# Patient Record
Sex: MALE | Employment: FULL TIME | URBAN - METROPOLITAN AREA
[De-identification: names, ages, dates, MRNs, and addresses within clinical notes are randomized per-mention and may not be internally consistent; named-entity substitution may affect disease eponyms.]

---

## 2018-03-01 ENCOUNTER — HOSPITAL ENCOUNTER (EMERGENCY)
Age: 28
Discharge: HOME | End: 2018-03-01
Attending: EMERGENCY MEDICINE
Payer: COMMERCIAL

## 2018-03-01 VITALS
WEIGHT: 315 LBS | TEMPERATURE: 98.4 F | RESPIRATION RATE: 18 BRPM | BODY MASS INDEX: 42.66 KG/M2 | DIASTOLIC BLOOD PRESSURE: 101 MMHG | HEART RATE: 70 BPM | OXYGEN SATURATION: 100 % | SYSTOLIC BLOOD PRESSURE: 175 MMHG | HEIGHT: 72 IN

## 2018-03-01 DIAGNOSIS — M54.50 ACUTE BILATERAL LOW BACK PAIN WITHOUT SCIATICA: Primary | ICD-10-CM

## 2018-03-01 DIAGNOSIS — R03.0 ELEVATED BLOOD PRESSURE READING: ICD-10-CM

## 2018-03-01 DIAGNOSIS — M62.830 BACK SPASM: ICD-10-CM

## 2018-03-01 PROCEDURE — 74011250637 HC RX REV CODE- 250/637: Performed by: PHYSICIAN ASSISTANT

## 2018-03-01 PROCEDURE — 99283 EMERGENCY DEPT VISIT LOW MDM: CPT

## 2018-03-01 RX ORDER — CYCLOBENZAPRINE HCL 10 MG
10 TABLET ORAL
Qty: 21 TAB | Refills: 0 | Status: SHIPPED | OUTPATIENT
Start: 2018-03-01 | End: 2018-10-15 | Stop reason: ALTCHOICE

## 2018-03-01 RX ORDER — SENNOSIDES 25 MG/1
TABLET, FILM COATED ORAL
Qty: 45 G | Refills: 0 | Status: SHIPPED | OUTPATIENT
Start: 2018-03-01 | End: 2018-10-15 | Stop reason: ALTCHOICE

## 2018-03-01 RX ORDER — ETODOLAC 400 MG/1
400 TABLET, FILM COATED ORAL 2 TIMES DAILY
Qty: 20 TAB | Refills: 0 | Status: SHIPPED | OUTPATIENT
Start: 2018-03-01 | End: 2018-10-15 | Stop reason: ALTCHOICE

## 2018-03-01 RX ORDER — OXYCODONE AND ACETAMINOPHEN 5; 325 MG/1; MG/1
1 TABLET ORAL
Status: COMPLETED | OUTPATIENT
Start: 2018-03-01 | End: 2018-03-01

## 2018-03-01 RX ADMIN — OXYCODONE HYDROCHLORIDE AND ACETAMINOPHEN 1 TABLET: 5; 325 TABLET ORAL at 19:14

## 2018-03-01 NOTE — LETTER
[facility] EMERGENCY DEPT 
[address] [facility] [address] 
[phone] Work/School Note Date: 3/1/2018 To Whom It May concern: 
 
Gay Klinefelter was seen and treated today in the emergency room by the following provider(s): 
Attending Provider: Belle Brown MD 
Physician Assistant: Aliza Tipton. Gay Klinefelter may return to work on 3 March 2018. Sincerely, Aliza Tipton

## 2018-03-01 NOTE — DISCHARGE INSTRUCTIONS

## 2018-03-01 NOTE — ED PROVIDER NOTES
EMERGENCY DEPARTMENT HISTORY AND PHYSICAL EXAM    6:25 PM      Date: 3/1/2018  Patient Name: Lindsay Romano    History of Presenting Illness     Chief Complaint   Patient presents with    Back Pain         History Provided By: Patient    Chief Complaint: back pain  Duration: 6 Days  Timing:  Constant  Location: lower back  Quality: Aching  Severity: Moderate  Modifying Factors: minimal relief with Motrin  Associated Symptoms: denies any other associated signs or symptoms      Additional History (Context): Lindsay Romano is a 32 y.o. male with No significant past medical history who presents with 6 days of constant moderate aching lower back pain with minimal relief with Motrin. Pt states he hurt his back a few years ago but has been pain free until about 6 days ago when his back started to hurt. Pt states he also gets occasional pain radiating down his right leg that only last for about a hour. No other concerns or symptoms at this time. PCP: Nelson Oquendo MD        Past History     Past Medical History:  History reviewed. No pertinent past medical history. Past Surgical History:  History reviewed. No pertinent surgical history. Family History:  History reviewed. No pertinent family history. Social History:  Social History   Substance Use Topics    Smoking status: Never Smoker    Smokeless tobacco: None    Alcohol use Yes       Allergies: Allergies   Allergen Reactions    Augmentin [Amoxicillin-Pot Clavulanate] Diarrhea         Review of Systems       Review of Systems   Constitutional: Negative for chills, fatigue and fever. HENT: Negative. Negative for sore throat. Eyes: Negative. Respiratory: Negative for cough and shortness of breath. Cardiovascular: Negative for chest pain and palpitations. Gastrointestinal: Negative for abdominal pain, nausea and vomiting. Genitourinary: Negative for dysuria. Musculoskeletal: Positive for back pain (lower back). Skin: Negative. Neurological: Negative for dizziness, weakness, light-headedness and headaches. Psychiatric/Behavioral: Negative. All other systems reviewed and are negative. Physical Exam     Visit Vitals    BP (!) 175/101 (BP 1 Location: Left arm, BP Patient Position: At rest)    Pulse 70    Temp 98.4 °F (36.9 °C)    Resp 18    Ht 6' (1.829 m)    Wt 147.4 kg (325 lb)    SpO2 100%    BMI 44.08 kg/m2         Physical Exam   Constitutional: He is oriented to person, place, and time. He appears well-developed and well-nourished. HENT:   Head: Normocephalic and atraumatic. Mouth/Throat: Oropharynx is clear and moist.   Eyes: Conjunctivae are normal. Pupils are equal, round, and reactive to light. No scleral icterus. Neck: Normal range of motion. Neck supple. No JVD present. No tracheal deviation present. Cardiovascular: Normal rate, regular rhythm and normal heart sounds. Pulmonary/Chest: Effort normal and breath sounds normal. No respiratory distress. He has no wheezes. Abdominal: Soft. Bowel sounds are normal.   Musculoskeletal: Normal range of motion. Non tender to midline palpation of the cervical, thoracic, and lumbar spine. Mild TTP over the lumbar paraspinals with no SLR. Ambulates without difficulty. No step off or deformity. FROM of BUE and BLE against resistance in flexion and extension with 5/5 strength. Non tender to bilateral shoulders/elbows/hands/hips/knees/ankles. Pulses intact and equal.       Neurological: He is alert and oriented to person, place, and time. He has normal strength. Gait normal. GCS eye subscore is 4. GCS verbal subscore is 5. GCS motor subscore is 6. Skin: Skin is warm and dry. Psychiatric: He has a normal mood and affect. His behavior is normal.   Nursing note and vitals reviewed. Diagnostic Study Results     Labs -  No results found for this or any previous visit (from the past 12 hour(s)).     Radiologic Studies -   No orders to display Medical Decision Making   I am the first provider for this patient. I reviewed the vital signs, available nursing notes, past medical history, past surgical history, family history and social history. Vital Signs-Reviewed the patient's vital signs. Records Reviewed: Nursing Notes and Old Medical Records (Time of Review: 6:25 PM)    ED Course: Progress Notes, Reevaluation, and Consults:      Provider Notes (Medical Decision Making): Impression:  Low back pain with spasm. Plan to discharge with flexeril, topical lidocaine cream, and lodine. Will give a dose of pain meds here. Patient agrees with the plan. Jess Dru, [address]        Diagnosis     Clinical Impression:   1. Acute bilateral low back pain without sciatica    2. Back spasm    3. Elevated blood pressure reading        Disposition: discharged    Follow-up Information     Follow up With Details Comments [address], [place] Go in 2 days For follow up [address] and [facility] Schedule an appointment as soon as possible for a visit For follow up [address] [facility], [address] [facility] [address] [facility] EMERGENCY DEPT Go to As needed, If symptoms worsen [address] [facility] [address]  [phone]           Patient's Medications   Start Taking    CYCLOBENZAPRINE (FLEXERIL) 10 MG TABLET    Take 1 Tab by mouth three (3) times daily as needed for Muscle Spasm(s). ETODOLAC (LODINE) 400 MG TABLET    Take 400 mg by mouth two (2) times a day. LIDOCAINE 5 % TOPICAL CREAM    Apply  to affected area two (2) times daily as needed for Pain.    Continue Taking    No medications on file   These Medications have changed    No medications on file   Stop Taking    No medications on file     _______________________________    Attestations:  [facility] acting as a scribe for and in the presence of Sohail patel Liban Thomason      March 01, 2018 at 6:25 PM       Provider Attestation:      I personally performed the services described in the documentation, reviewed the documentation, as recorded by the scribe in my presence, and it accurately and completely records my words and actions.  March 01, 2018 at 6:25 PM - Gurjit Rollins PA-C   _______________________________

## 2018-10-15 ENCOUNTER — OFFICE VISIT (OUTPATIENT)
Dept: ORTHOPEDIC SURGERY | Age: 28
End: 2018-10-15

## 2018-10-15 VITALS
BODY MASS INDEX: 42.66 KG/M2 | WEIGHT: 315 LBS | SYSTOLIC BLOOD PRESSURE: 152 MMHG | RESPIRATION RATE: 16 BRPM | HEIGHT: 72 IN | TEMPERATURE: 98 F | HEART RATE: 63 BPM | OXYGEN SATURATION: 90 % | DIASTOLIC BLOOD PRESSURE: 106 MMHG

## 2018-10-15 DIAGNOSIS — M54.50 NONSPECIFIC PAIN IN THE LUMBAR REGION: ICD-10-CM

## 2018-10-15 DIAGNOSIS — M79.18 MYOFASCIAL PAIN: Primary | ICD-10-CM

## 2018-10-15 PROBLEM — E66.01 OBESITY, MORBID (HCC): Status: ACTIVE | Noted: 2018-10-15

## 2018-10-15 RX ORDER — IBUPROFEN 200 MG
CAPSULE ORAL
COMMUNITY

## 2018-10-15 NOTE — PATIENT INSTRUCTIONS
Back Stretches: Exercises  Your Care Instructions  Here are some examples of exercises for stretching your back. Start each exercise slowly. Ease off the exercise if you start to have pain. Your doctor or physical therapist will tell you when you can start these exercises and which ones will work best for you. How to do the exercises  Overhead stretch    1. Stand comfortably with your feet shoulder-width apart. 2. Looking straight ahead, raise both arms over your head and reach toward the ceiling. Do not allow your head to tilt back. 3. Hold for 15 to 30 seconds, then lower your arms to your sides. 4. Repeat 2 to 4 times. Side stretch    1. Stand comfortably with your feet shoulder-width apart. 2. Raise one arm over your head, and then lean to the other side. 3. Slide your hand down your leg as you let the weight of your arm gently stretch your side muscles. Hold for 15 to 30 seconds. 4. Repeat 2 to 4 times on each side. Press-up    1. Lie on your stomach, supporting your body with your forearms. 2. Press your elbows down into the floor to raise your upper back. As you do this, relax your stomach muscles and allow your back to arch without using your back muscles. As your press up, do not let your hips or pelvis come off the floor. 3. Hold for 15 to 30 seconds, then relax. 4. Repeat 2 to 4 times. Relax and rest    1. Lie on your back with a rolled towel under your neck and a pillow under your knees. Extend your arms comfortably to your sides. 2. Relax and breathe normally. 3. Remain in this position for about 10 minutes. 4. If you can, do this 2 or 3 times each day. Follow-up care is a key part of your treatment and safety. Be sure to make and go to all appointments, and call your doctor if you are having problems. It's also a good idea to know your test results and keep a list of the medicines you take. Where can you learn more? Go to http://yvonne-jose daniel.info/.   Enter N795 in the search box to learn more about \"Back Stretches: Exercises. \"  Current as of: November 29, 2017  Content Version: 11.8  © 1918-3260 [employer]. Care instructions adapted under license by [employer] (which disclaims liability or warranty for this information). If you have questions about a medical condition or this instruction, always ask your healthcare professional. [address] any warranty or liability for your use of this information. Low Back Pain: Exercises  Your Care Instructions  Here are some examples of typical rehabilitation exercises for your condition. Start each exercise slowly. Ease off the exercise if you start to have pain. Your doctor or physical therapist will tell you when you can start these exercises and which ones will work best for you. How to do the exercises  Press-up    5. Lie on your stomach, supporting your body with your forearms. 6. Press your elbows down into the floor to raise your upper back. As you do this, relax your stomach muscles and allow your back to arch without using your back muscles. As your press up, do not let your hips or pelvis come off the floor. 7. Hold for 15 to 30 seconds, then relax. 8. Repeat 2 to 4 times. Alternate arm and leg (bird dog) exercise    5. Start on the floor, on your hands and knees. 6. Tighten your belly muscles. 7. Raise one leg off the floor, and hold it straight out behind you. Be careful not to let your hip drop down, because that will twist your trunk. 8. Hold for about 6 seconds, then lower your leg and switch to the other leg. 9. Repeat 8 to 12 times on each leg. 10. Over time, work up to holding for 10 to 30 seconds each time. 11. If you feel stable and secure with your leg raised, try raising the opposite arm straight out in front of you at the same time. Knee-to-chest exercise    5. Lie on your back with your knees bent and your feet flat on the floor.   6. Bring one knee to your chest, keeping the other foot flat on the floor (or keeping the other leg straight, whichever feels better on your lower back). 7. Keep your lower back pressed to the floor. Hold for at least 15 to 30 seconds. 8. Relax, and lower the knee to the starting position. 9. Repeat with the other leg. Repeat 2 to 4 times with each leg. 10. To get more stretch, put your other leg flat on the floor while pulling your knee to your chest.  Curl-ups    5. Lie on the floor on your back with your knees bent at a 90-degree angle. Your feet should be flat on the floor, about 12 inches from your buttocks. 6. Cross your arms over your chest. If this bothers your neck, try putting your hands behind your neck (not your head), with your elbows spread apart. 7. Slowly tighten your belly muscles and raise your shoulder blades off the floor. 8. Keep your head in line with your body, and do not press your chin to your chest.  9. Hold this position for 1 or 2 seconds, then slowly lower yourself back down to the floor. 10. Repeat 8 to 12 times. Pelvic tilt exercise    1. Lie on your back with your knees bent. 2. \"Brace\" your stomach. This means to tighten your muscles by pulling in and imagining your belly button moving toward your spine. You should feel like your back is pressing to the floor and your hips and pelvis are rocking back. 3. Hold for about 6 seconds while you breathe smoothly. 4. Repeat 8 to 12 times. Heel dig bridging    1. Lie on your back with both knees bent and your ankles bent so that only your heels are digging into the floor. Your knees should be bent about 90 degrees. 2. Then push your heels into the floor, squeeze your buttocks, and lift your hips off the floor until your shoulders, hips, and knees are all in a straight line. 3. Hold for about 6 seconds as you continue to breathe normally, and then slowly lower your hips back down to the floor and rest for up to 10 seconds.   4. Do 8 to 12 repetitions. Hamstring stretch in doorway    1. Lie on your back in a doorway, with one leg through the open door. 2. Slide your leg up the wall to straighten your knee. You should feel a gentle stretch down the back of your leg. 3. Hold the stretch for at least 15 to 30 seconds. Do not arch your back, point your toes, or bend either knee. Keep one heel touching the floor and the other heel touching the wall. 4. Repeat with your other leg. 5. Do 2 to 4 times for each leg. Hip flexor stretch    1. Kneel on the floor with one knee bent and one leg behind you. Place your forward knee over your foot. Keep your other knee touching the floor. 2. Slowly push your hips forward until you feel a stretch in the upper thigh of your rear leg. 3. Hold the stretch for at least 15 to 30 seconds. Repeat with your other leg. 4. Do 2 to 4 times on each side. Wall sit    1. Stand with your back 10 to 12 inches away from a wall. 2. Lean into the wall until your back is flat against it. 3. Slowly slide down until your knees are slightly bent, pressing your lower back into the wall. 4. Hold for about 6 seconds, then slide back up the wall. 5. Repeat 8 to 12 times. Follow-up care is a key part of your treatment and safety. Be sure to make and go to all appointments, and call your doctor if you are having problems. It's also a good idea to know your test results and keep a list of the medicines you take. Where can you learn more? Go to http://yvonne-jose daniel.info/. Enter R457 in the search box to learn more about \"Low Back Pain: Exercises. \"  Current as of: November 29, 2017  Content Version: 11.8  © 0866-4392 [employer]. Care instructions adapted under license by [employer] (which disclaims liability or warranty for this information).  If you have questions about a medical condition or this instruction, always ask your healthcare professional. Mario Hernandez any warranty or liability for your use of this information.

## 2018-10-15 NOTE — PROGRESS NOTES
[address]  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 He has had episodes of his back \"seizing up. \" He denies weakness. His pain improves with movement and stretching. He maintains an HEP of work related tasks. On examination, he maintains strength and sensation. He is neurologically intact. He had tenderness to palpation of his lumbar paraspinals. He had no pain reproduced with provocative movements. His pain is likely myofascial in nature. I referred to PT with optional dry needling and pilates equipment based therapy. I thoroughly advised on exercising and maintaining an HEP. I also advised on proper biomechanics. Pt will f/u in 6 weeks or sooner if needed. Diagnoses and all orders for this visit:    1. Myofascial pain  -     REFERRAL TO PHYSICAL THERAPY    2. 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 Pt denies recent weight gain or tight clothing. Pt denies any weakness. He describes his low back pain currently as \"mild\" but with flare ups. He had an episode where he was walking around his house, and his \"back seized up. \" He also had a similar episode when he worked at [employer] and set down a [employer].  He attempts to avoid sitting still, as loosening his back with movement improves his pain. He will also perform exercises from his previous round of PT (2014). He found some relief from Flexeril. He has also found some relief from massage. He maintains an HEP of work-related tasks (lifting tools, walking up stairs). He previously was active in martial arts 3+ years ago, and at the time, he experienced no pain. 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 Pt denies any fevers, chills, nausea, vomiting. Pt denies any chest pain and shortness of breath. Pt denies any ear, nose, and throat problems. Pt denies any fecal or urinary incontinence. PAST MEDICAL HISTORY   No past medical history on file. No past surgical history on file. MEDICATIONS    Current Outpatient Prescriptions   Medication Sig Dispense Refill    cyclobenzaprine (FLEXERIL) 10 mg tablet Take 1 Tab by mouth three (3) times daily as needed for Muscle Spasm(s). 21 Tab 0    lidocaine 5 % topical cream Apply  to affected area two (2) times daily as needed for Pain. 45 g 0    etodolac (LODINE) 400 mg tablet Take 400 mg by mouth two (2) times a day. 20 Tab 0       ALLERGIES  Allergies   Allergen Reactions    Augmentin [Amoxicillin-Pot Clavulanate] Diarrhea          SOCIAL HISTORY    Social History     Social History    Marital status:      Spouse name: N/A    Number of children: N/A    Years of education: N/A     Social History Main Topics    Smoking status: Never Smoker    Smokeless tobacco: Not on file    Alcohol use Yes    Drug use: No    Sexual activity: Not on file     Other Topics Concern    Not on file     Social History Narrative    No narrative on file       FAMILY HISTORY  No family history on file. REVIEW OF SYSTEMS  Review of Systems   Musculoskeletal: Positive for back pain.         Episodic left thigh paraesthesia         PHYSICAL EXAMINATION  Visit Vitals    BP (!) 152/106    Pulse 63    Temp 98 °F (36.7 °C) (Oral)    Resp 16    Ht 6' (1.829 m)    Wt 336 lb (152.4 kg)    SpO2 90%    BMI 45.57 kg/m2         Pain Assessment  10/15/2018   Location of Pain Back   Severity of Pain 2   Quality of Pain Throbbing; Sharp   Duration of Pain A few hours   Frequency of Pain Intermittent   Aggravating Factors Bending   Relieving Factors Rest;Ice;Heat   Result of Injury No         Constitutional:  Well developed, well nourished, in no acute distress. Psychiatric: Affect and mood are appropriate. HEENT: Normocephalic, atraumatic. Extraocular movements intact. Integumentary: No rashes or abrasions noted on exposed areas. Cardiovascular: Regular rate and rhythm. Pulmonary: Clear to auscultation bilaterally. SPINE/MUSCULOSKELETAL EXAM    Cervical spine:  Neck is midline. Normal muscle tone. No focal atrophy is noted. ROM pain free. Shoulder ROM intact. No tenderness to palpation. Negative Spurling's sign. Negative Tinel's sign. Negative Rees's sign. Sensation in the bilateral arms grossly intact to light touch. Lumbar spine:  No rash, ecchymosis, or gross obliquity. No fasciculations. No focal atrophy is noted. No pain with hip ROM. Full range of motion. Tenderness to palpation of lumbar paraspinals (L>R). No tenderness to palpation at the sciatic notch. SI joints non-tender. Trochanters non tender. Sensation in the bilateral legs grossly intact to light touch. MOTOR:      Biceps  Triceps Deltoids Wrist Ext Wrist Flex Hand Intrin   Right 5/5 5/5 5/5 5/5 5/5 5/5   Left 5/5 5/5 5/5 5/5 5/5 5/5             Hip Flex  Quads Hamstrings Ankle DF EHL Ankle PF   Right 5/5 5/5 5/5 5/5 5/5 5/5   Left 5/5 5/5 5/5 5/5 5/5 5/5     DTRs are 2+ biceps, triceps, brachioradialis, patella, and Achilles. Negative Straight Leg raise. Squat not tested. No difficulty with tandem gait. Ambulation without assistive device. FWB.       RADIOGRAPHS  2V Lumbar XR images taken on 10/15/18 personally reviewed with patient:  Joint space narrowing in bilateral hips. No significant facet sclerosis. Slight pelvic obliquity with left hip higher than right. Sacralized L5. No obvious pars defects. Disc space narrowing vs obliquity at T12-L1, otherwise, normal disc spacing. No obvious compression fractures or instabilities.  reviewed    Mr. Manuel Pascual has a reminder for a \"due or due soon\" health maintenance. I have asked that he contact his primary care provider for follow-up on this health maintenance. 30 minutes of face-to-face contact were spent with the patient during today's visit extensively discussing symptoms and treatment plan. All questions were answered. More than half of this visit today was spent on counseling. Written by Sheila Lopez, as dictated by Dr. Emery Jiang. I, Dr. Emery Jiang, confirm that all documentation is accurate.

## 2018-10-15 NOTE — MR AVS SNAPSHOT
[address] [facility] [address] [facility] [address] 
[phone] Patient: Brandie Pisano MRN: [id number] XAM:2/18/7663 Visit Information Date & Time Provider Department Dept. Phone Encounter #  
 10/15/2018 10:00 AM Evelin Ralph MD [address] [facility] [phone] [id number] Follow-up Instructions Return in about 6 weeks (around 11/26/2018). Your Appointments 10/15/2018 10:00 AM  
New Patient with Evelin Ralph MD  
[address] [facility], [address] [facility] ([address] [facility]) Appt Note: BACK PAIN/NX/BCBS/REF BY FAMILY; PT CALLED AND [facility] APPT. PT ADVISED TO BRING ID,CP, INS CARD,LIST OF MEDS AND TO ARRIVE 30 MINS EARLY. [address] [facility] [address]  
[phone]  
  
   
 [facility] [address] [facility] [facility] [address] Upcoming Health Maintenance Date Due DTaP/Tdap/Td series (1 - Tdap) 3/15/2009 Influenza Age 5 to Adult 8/1/2018 Allergies as of 10/15/2018  Review Complete On: 10/15/2018 By: Miley Carlos LPN Severity Noted Reaction Type Reactions Augmentin [Amoxicillin-pot Clavulanate]  06/01/2013    Diarrhea Current Immunizations  Never Reviewed No immunizations on file. Not reviewed this visit You Were Diagnosed With   
  
 Codes Comments Myofascial pain    -  Primary ICD-10-CM: M79.18 ICD-9-CM: 729.1 Nonspecific pain in the lumbar region     ICD-10-CM: M54.5 ICD-9-CM: 724.2 Vitals BP Pulse Temp Resp Height(growth percentile) Weight(growth percentile) (!) 152/106 63 98 °F (36.7 °C) (Oral) 16 6' (1.829 m) 336 lb (152.4 kg) SpO2 BMI Smoking Status 90% 45.57 kg/m2 Never Smoker BMI and BSA Data Body Mass Index Body Surface Area 45.57 kg/m 2 2.78 m 2 Your Updated Medication List  
  
   
 This list is accurate as of 10/15/18  9:39 AM.  Always use your most recent med list.  
  
  
  
  
 ibuprofen 200 mg Cap Take  by mouth. We Performed the Following AMB POC XRAY, SPINE, LUMBOSACRAL; 2 O [16039 CPT(R)] REFERRAL TO PHYSICAL THERAPY [FAC93 Custom] Comments:  
 eval and treat Low back pain w/ episodic paraesthesia in anterior LLE Dx: lumbar myofascial pain Include optional dry needling And pilates equipment based therapy Include other modalities as appropriate Follow-up Instructions Return in about 6 weeks (around 11/26/2018). Referral Information Referral ID Referred By Referred To  
  
 [id number] Severo Holster, [facility] [place]   
   [address] Phone: [phone] Fax: [phone] Visits Status Start Date End Date 1 New Request 10/15/18 10/15/19 If your referral has a status of pending review or denied, additional information will be sent to support the outcome of this decision. Patient Instructions Back Stretches: Exercises Your Care Instructions Here are some examples of exercises for stretching your back. Start each exercise slowly. Ease off the exercise if you start to have pain. Your doctor or physical therapist will tell you when you can start these exercises and which ones will work best for you. How to do the exercises Overhead stretch 1. Stand comfortably with your feet shoulder-width apart. 2. Looking straight ahead, raise both arms over your head and reach toward the ceiling. Do not allow your head to tilt back. 3. Hold for 15 to 30 seconds, then lower your arms to your sides. 4. Repeat 2 to 4 times. Side stretch 1. Stand comfortably with your feet shoulder-width apart. 2. Raise one arm over your head, and then lean to the other side.  
3. Slide your hand down your leg as you let the weight of your arm gently stretch your side muscles. Hold for 15 to 30 seconds. 4. Repeat 2 to 4 times on each side. Press-up 1. Lie on your stomach, supporting your body with your forearms. 2. Press your elbows down into the floor to raise your upper back. As you do this, relax your stomach muscles and allow your back to arch without using your back muscles. As your press up, do not let your hips or pelvis come off the floor. 3. Hold for 15 to 30 seconds, then relax. 4. Repeat 2 to 4 times. Relax and rest 
 
1. Lie on your back with a rolled towel under your neck and a pillow under your knees. Extend your arms comfortably to your sides. 2. Relax and breathe normally. 3. Remain in this position for about 10 minutes. 4. If you can, do this 2 or 3 times each day. Follow-up care is a key part of your treatment and safety. Be sure to make and go to all appointments, and call your doctor if you are having problems. It's also a good idea to know your test results and keep a list of the medicines you take. Where can you learn more? Go to http://yvonneDeath by Partyjose daniel.info/. Enter H476 in the search box to learn more about \"Back Stretches: Exercises. \" Current as of: November 29, 2017 Content Version: 11.8 © 2290-9168 Healthwise, Incorporated. Care instructions adapted under license by [employer] (which disclaims liability or warranty for this information). If you have questions about a medical condition or this instruction, always ask your healthcare professional. [address] any warranty or liability for your use of this information. Low Back Pain: Exercises Your Care Instructions Here are some examples of typical rehabilitation exercises for your condition. Start each exercise slowly. Ease off the exercise if you start to have pain. Your doctor or physical therapist will tell you when you can start these exercises and which ones will work best for you. How to do the exercises Press-up 5. Lie on your stomach, supporting your body with your forearms. 6. Press your elbows down into the floor to raise your upper back. As you do this, relax your stomach muscles and allow your back to arch without using your back muscles. As your press up, do not let your hips or pelvis come off the floor. 7. Hold for 15 to 30 seconds, then relax. 8. Repeat 2 to 4 times. Alternate arm and leg (bird dog) exercise 5. Start on the floor, on your hands and knees. 6. Tighten your belly muscles. 7. Raise one leg off the floor, and hold it straight out behind you. Be careful not to let your hip drop down, because that will twist your trunk. 8. Hold for about 6 seconds, then lower your leg and switch to the other leg. 9. Repeat 8 to 12 times on each leg. 10. Over time, work up to holding for 10 to 30 seconds each time. 11. If you feel stable and secure with your leg raised, try raising the opposite arm straight out in front of you at the same time. Knee-to-chest exercise 5. Lie on your back with your knees bent and your feet flat on the floor. 6. Bring one knee to your chest, keeping the other foot flat on the floor (or keeping the other leg straight, whichever feels better on your lower back). 7. Keep your lower back pressed to the floor. Hold for at least 15 to 30 seconds. 8. Relax, and lower the knee to the starting position. 9. Repeat with the other leg. Repeat 2 to 4 times with each leg. 10. To get more stretch, put your other leg flat on the floor while pulling your knee to your chest. 
Curl-ups 5. Lie on the floor on your back with your knees bent at a 90-degree angle. Your feet should be flat on the floor, about 12 inches from your buttocks. 6. Cross your arms over your chest. If this bothers your neck, try putting your hands behind your neck (not your head), with your elbows spread apart. 7. Slowly tighten your belly muscles and raise your shoulder blades off the floor. 8. Keep your head in line with your body, and do not press your chin to your chest. 
9. Hold this position for 1 or 2 seconds, then slowly lower yourself back down to the floor. 10. Repeat 8 to 12 times. Pelvic tilt exercise 1. Lie on your back with your knees bent. 2. \"Brace\" your stomach. This means to tighten your muscles by pulling in and imagining your belly button moving toward your spine. You should feel like your back is pressing to the floor and your hips and pelvis are rocking back. 3. Hold for about 6 seconds while you breathe smoothly. 4. Repeat 8 to 12 times. Heel dig bridging 1. Lie on your back with both knees bent and your ankles bent so that only your heels are digging into the floor. Your knees should be bent about 90 degrees. 2. Then push your heels into the floor, squeeze your buttocks, and lift your hips off the floor until your shoulders, hips, and knees are all in a straight line. 3. Hold for about 6 seconds as you continue to breathe normally, and then slowly lower your hips back down to the floor and rest for up to 10 seconds. 4. Do 8 to 12 repetitions. Hamstring stretch in doorway 1. Lie on your back in a doorway, with one leg through the open door. 2. Slide your leg up the wall to straighten your knee. You should feel a gentle stretch down the back of your leg. 3. Hold the stretch for at least 15 to 30 seconds. Do not arch your back, point your toes, or bend either knee. Keep one heel touching the floor and the other heel touching the wall. 4. Repeat with your other leg. 5. Do 2 to 4 times for each leg. Hip flexor stretch 1. Kneel on the floor with one knee bent and one leg behind you. Place your forward knee over your foot. Keep your other knee touching the floor. 2. Slowly push your hips forward until you feel a stretch in the upper thigh of your rear leg. 3. Hold the stretch for at least 15 to 30 seconds. Repeat with your other leg. 4. Do 2 to 4 times on each side. Wall sit 1. Stand with your back 10 to 12 inches away from a wall. 2. Lean into the wall until your back is flat against it. 3. Slowly slide down until your knees are slightly bent, pressing your lower back into the wall. 4. Hold for about 6 seconds, then slide back up the wall. 5. Repeat 8 to 12 times. Follow-up care is a key part of your treatment and safety. Be sure to make and go to all appointments, and call your doctor if you are having problems. It's also a good idea to know your test results and keep a list of the medicines you take. Where can you learn more? Go to http://yvonne-jose daniel.info/. Enter Y085 in the search box to learn more about \"Low Back Pain: Exercises. \" Current as of: November 29, 2017 Content Version: 11.8 © 8042-2317 [employer]. Care instructions adapted under license by [employer] (which disclaims liability or warranty for this information). If you have questions about a medical condition or this instruction, always ask your healthcare professional. [place] [address] any warranty or liability for your use of this information. Introducing [facility]! Dear Severiano Eva: 
Thank you for requesting a Appoet account. Our records indicate that you already have an active Appoet account. You can access your account anytime at [email or web address] Did you know that you can access your hospital and ER discharge instructions at any time in Appoet? You can also review all of your test results from your hospital stay or ER visit. Additional Information If you have questions, please visit the Frequently Asked Questions section of the Appoet website at [email or web address] Remember, Appoet is NOT to be used for urgent needs.  For medical emergencies, dial 911. Now available from your iPhone and Android! Please provide this summary of care documentation to your next provider. Your primary care clinician is listed as Alphonzo Schlatter. If you have any questions after today's visit, please call [phone].

## 2018-10-29 ENCOUNTER — HOSPITAL ENCOUNTER (OUTPATIENT)
Dept: PHYSICAL THERAPY | Age: 28
Discharge: HOME | End: 2018-10-29
Payer: COMMERCIAL

## 2018-10-29 PROCEDURE — 97161 PT EVAL LOW COMPLEX 20 MIN: CPT

## 2018-10-29 PROCEDURE — 97110 THERAPEUTIC EXERCISES: CPT

## 2018-10-29 PROCEDURE — 97112 NEUROMUSCULAR REEDUCATION: CPT

## 2018-10-29 NOTE — PROGRESS NOTES
PT DAILY TREATMENT NOTE 10-18 Patient Name: Justice Lynn Date:10/29/2018 : 1990 [x]  Patient  Verified Payor: BLUE CROSS / [place] / Product Type: PPO / In time:4:58  Out time:5:45 Total Treatment Time (min): 47 Visit #: 1 of 8 Medicare/BCBS Only Total Timed Codes (min):  37 1:1 Treatment Time:  37 Treatment Area: Myalgia, other site [M79.18] Low back pain [M54.5] SUBJECTIVE Pain Level (0-10 scale): 2/10 Any medication changes, allergies to medications, adverse drug reactions, diagnosis change, or new procedure performed?: [x] No    [] Yes (see summary sheet for update) Subjective functional status/changes:   [] No changes reported The patient has a chief complaint of low back pain. OBJECTIVE 27 min [x]Eval                  []Re-Eval  
 
 
10 min Therapeutic Exercise:  [x] See flow sheet :  
Rationale: increase ROM and increase strength to improve the patients ability to improve ADL ease. 10 min Neuromuscular Re-education:  []  See flow sheet : planks, quadruped, single leg bridge. Rationale: increase ROM and increase strength  to improve the patients ability to improve ADL ease. With 
 [] TE 
 [] TA 
 [] neuro 
 [] other: Patient Education: [x] Review HEP [] Progressed/Changed HEP based on:  
[] positioning   [] body mechanics   [] transfers   [] heat/ice application   
[] other:   
 
Other Objective/Functional Measures: See IE Pain Level (0-10 scale) post treatment: 210 ASSESSMENT/Changes in Function: See POC.  
 
Patient will continue to benefit from skilled PT services to modify and progress therapeutic interventions, address functional mobility deficits, address ROM deficits, address strength deficits, analyze and address soft tissue restrictions, analyze and cue movement patterns, analyze and modify body mechanics/ergonomics, assess and modify postural abnormalities and instruct in home and community integration to attain remaining goals. [x]  See Plan of Care 
[]  See progress note/recertification 
[]  See Discharge Summary Progress towards goals / Updated goals: 
Short Term Goals: To be accomplished in 2 weeks: 1. The patient will be independent and compliant with HEP to maximize therapeutic benefit. 2. The patient will improve HS flexibility to 20 90/90 to reduce stress on lumbar spine. Long Term Goals: To be accomplished in 4 weeks: 1. The patient will improve FOTO score to 69 to maximize quality of life. 2. The patient will report no longer being limited by recreational activity to improve ease of recreation. 3. The patient will report not being limited by walking 1 mile. 4. The patient will improve prone plank to 30\" without anterior pelvic tilt to maximize stability during lifting. PLAN 
[]  Upgrade activities as tolerated     [x]  Continue plan of care 
[]  Update interventions per flow sheet      
[]  Discharge due to:_ 
[]  Other:_   
 
Xi Walker, PT 10/29/2018  5:57 PM 
 
Future Appointments Date Time Provider Anna Oseguera 11/12/2018  6:00 PM Diogenes Arguello, PT [facility]  
11/16/2018  5:00 PM [place], [address] [facility]  
11/20/2018  5:00 PM [place], [address] [facility]  
11/27/2018  5:00 PM [place], [address] [facility]  
12/3/2018 10:30 AM Maribel Burrows  [address]  
12/4/2018  5:00 PM Steve Diehl [facility]

## 2018-10-29 NOTE — PROGRESS NOTES
In [address] [facility] [address]  [address] [place], [address] 
[phone] fax Plan of Care/ Statement of Necessity for Physical Therapy Services Patient name: Byron Lovell Start of Care: 10/29/2018 Referral source: Taty Pond MD : 1990 Medical Diagnosis: Myalgia, other site [M79.18] Low back pain [M54.5] Payor: BLUE CROSS / Plan: [place] / Product Type: PPO /  Onset Date:Chronic, most recently 1 month ago Treatment Diagnosis: Mechanical low back pain Prior Hospitalization: see medical history Provider#: [id number] Medications: Verified on Patient summary List  
 Comorbidities: None listed Prior Level of Function: The patient states that he had improved ease sitting prior to about 1 month ago. The Plan of Care and following information is based on the information from the initial evaluation. Assessment/ key information: The patient is a 25year old male with a chief complaint of low back pain that is quite chronic, but became exacerbated about 1 month ago without specific injury pattern. States that sitting or being immobile tends to increase his pain. He has had recent radiographs which were negative. He presents with signs and symptoms that appear consistent with mechanical back pain with associated impairments consisting of pain, decreased flexibility, decreased strength, limited sitting tolerance, and limited occupational efficiency. The patient will benefit from skilled PT in order to address the aforementioned impairments.  
 
Evaluation Complexity History LOW Complexity : Zero comorbidities / personal factors that will impact the outcome / POC; Examination LOW Complexity : 1-2 Standardized tests and measures addressing body structure, function, activity limitation and / or participation in recreation  ;Presentation LOW Complexity : Stable, uncomplicated ;Clinical Decision Making MEDIUM Complexity : FOTO score of 26-74 Overall Complexity Rating: LOW Problem List: pain affecting function, decrease ROM, decrease strength, decrease ADL/ functional abilitiies, decrease activity tolerance and decrease flexibility/ joint mobility Treatment Plan may include any combination of the following: Therapeutic exercise, Therapeutic activities, Neuromuscular re-education, Physical agent/modality, Manual therapy, Patient education and Home safety training Patient / Family readiness to learn indicated by: asking questions, trying to perform skills and interest 
Persons(s) to be included in education: patient (P) Barriers to Learning/Limitations: Financial 
Patient Goal (s): Relieve pain and prevent future flair ups.  
Patient Self Reported Health Status: good Rehabilitation Potential: good Short Term Goals: To be accomplished in 2 weeks: 1. The patient will be independent and compliant with HEP to maximize therapeutic benefit. 2. The patient will improve HS flexibility to 20 90/90 to reduce stress on lumbar spine. Long Term Goals: To be accomplished in 4 weeks: 1. The patient will improve FOTO score to 69 to maximize quality of life. 2. The patient will report no longer being limited by recreational activity to improve ease of recreation. 3. The patient will report not being limited by walking 1 mile. 4. The patient will improve prone plank to 30\" without anterior pelvic tilt to maximize stability during lifting. Frequency / Duration: Patient to be seen 1-2 times per week for 4 weeks. Patient/ Caregiver education and instruction: Diagnosis, prognosis, self care, activity modification and exercises 
 [x]  Plan of care has been reviewed with PTA The severity rating is based on clinical judgment and the FOTO score. Ricardo Baker, PT 10/29/2018 5:45 PM 
 
________________________________________________________________________ I certify that the above Therapy Services are being furnished while the patient is under my care. I agree with the treatment plan and certify that this therapy is necessary. [de-identified] Signature:____________________  Date:____________Time: _________ Please sign and return to [address] [place], [address] 
[phone] fax

## 2018-11-12 ENCOUNTER — HOSPITAL ENCOUNTER (OUTPATIENT)
Dept: PHYSICAL THERAPY | Age: 28
Discharge: HOME | End: 2018-11-12
Payer: COMMERCIAL

## 2018-11-12 PROCEDURE — 97112 NEUROMUSCULAR REEDUCATION: CPT

## 2018-11-12 PROCEDURE — 97110 THERAPEUTIC EXERCISES: CPT

## 2018-11-13 NOTE — PROGRESS NOTES
PT DAILY TREATMENT NOTE 10-18 Patient Name: Wen Gautam Date:2018 : 1990 [x]  Patient  Verified Payor: BLUE CROSS / Plan: [place] FEDERAL / Product Type: PPO / In time:5:52  Out time:6:45 Total Treatment Time (min): 53 Visit #: 2 of 8 Medicare/BCBS Only Total Timed Codes (min):  53 1:1 Treatment Time:  32 Treatment Area: Low back pain [M54.5] Myalgia, other site [M79.18] SUBJECTIVE Pain Level (0-10 scale): 2/10 Any medication changes, allergies to medications, adverse drug reactions, diagnosis change, or new procedure performed?: [x] No    [] Yes (see summary sheet for update) Subjective functional status/changes:   [] No changes reported The patient indicates that, overall, his back is doing well. He states it was tight earlier, but he stretched it and it felt better. OBJECTIVE 10 min Therapeutic Exercise:  [x] See flow sheet :  
Rationale: increase ROM and increase strength to improve the patients ability to improve ADL ease. 43 min Neuromuscular Re-education:  [x]  See flow sheet :  
Rationale: increase strength and improve coordination  to improve the patients ability to improve ADL ease. With 
 [] TE 
 [] TA 
 [] neuro 
 [] other: Patient Education: [x] Review HEP [] Progressed/Changed HEP based on:  
[] positioning   [] body mechanics   [] transfers   [] heat/ice application   
[] other:   
 
Other Objective/Functional Measures:  
First follow-up. Pain Level (0-10 scale) post treatment: 0/10 ASSESSMENT/Changes in Function: Requires cues to perform proper squat form with chops. No pain reproduction during session, with decrease in pain post session.  
 
Patient will continue to benefit from skilled PT services to modify and progress therapeutic interventions, address functional mobility deficits, address ROM deficits, address strength deficits, analyze and address soft tissue restrictions, analyze and cue movement patterns, analyze and modify body mechanics/ergonomics, assess and modify postural abnormalities and instruct in home and community integration to attain remaining goals. []  See Plan of Care 
[]  See progress note/recertification 
[]  See Discharge Summary Progress towards goals / Updated goals: 
Short Term Goals: To be accomplished in 2 weeks: 
            9. The patient will be independent and compliant with HEP to maximize therapeutic benefit. Met 11/012/2018 
            2. The patient will improve HS flexibility to 20 90/90 to reduce stress on lumbar spine. Long Term Goals: To be accomplished in 4 weeks: 
            1. The patient will improve FOTO score to 69 to maximize quality of life. 
            2. The patient will report no longer being limited by recreational activity to improve ease of recreation. 
            3. The patient will report not being limited by walking 1 mile. 
            4. The patient will improve prone plank to 30\" without anterior pelvic tilt to maximize stability during lifting.   
 
 
 
PLAN 
[]  Upgrade activities as tolerated     [x]  Continue plan of care 
[]  Update interventions per flow sheet      
[]  Discharge due to:_ 
[]  Other:_   
 
Ricardo Baker, PT 11/12/2018  7:28 PM 
 
Future Appointments Date Time Provider Anna Oseguera 11/16/2018  5:00 PM [place], [address] [facility]  
11/20/2018  5:00 PM [place], [address] [facility]  
11/27/2018  5:00 PM [place], [address] [facility]  
12/3/2018 10:30 AM Dominique Fleming  [address]  
12/4/2018  5:00 PM Marylynn Lombard [facility]

## 2018-11-16 ENCOUNTER — HOSPITAL ENCOUNTER (OUTPATIENT)
Dept: PHYSICAL THERAPY | Age: 28
Discharge: HOME | End: 2018-11-16
Payer: COMMERCIAL

## 2018-11-16 PROCEDURE — 97112 NEUROMUSCULAR REEDUCATION: CPT

## 2018-11-16 PROCEDURE — 97110 THERAPEUTIC EXERCISES: CPT

## 2018-11-16 NOTE — PROGRESS NOTES
PT DAILY TREATMENT NOTE 10-18 Patient Name: Elo Self Date:2018 : 1990 [x]  Patient  Verified Payor: BLUE CROSS / Plan: [place] / Product Type: PPO / In time:5:00  Out time:5:39 Total Treatment Time (min): 39 Visit #: 3 of 8 Medicare/BCBS Only Total Timed Codes (min):  39 1:1 Treatment Time: 28 Treatment Area: Low back pain [M54.5] Myalgia, other site [M79.18] SUBJECTIVE Pain Level (0-10 scale): 1.5 Any medication changes, allergies to medications, adverse drug reactions, diagnosis change, or new procedure performed?: [x] No    [] Yes (see summary sheet for update) Subjective functional status/changes:   [] No changes reported \"Actually been doing really good. On Tuesday my back felt glorious\" OBJECTIVE 21 min Therapeutic Exercise:  [] See flow sheet :  
Rationale: increase ROM and increase strength to improve the patients ability to perform daily tasks with improved ease 18 min Neuromuscular Re-education:  []  See flow sheet :  
Rationale: improve coordination and increase proprioception  to improve the patients ability to perform work duties and ADLs with improved core stability With 
 [] TE 
 [] TA 
 [] neuro 
 [] other: Patient Education: [x] Review HEP [] Progressed/Changed HEP based on:  
[] positioning   [] body mechanics   [] transfers   [] heat/ice application   
[] other:   
 
Other Objective/Functional Measures:   
 
Pain Level (0-10 scale) post treatment: 0 
 
ASSESSMENT/Changes in Function: Pt doing well with current POC thus held DN today. He reports well controlled back pain throughout the session but does have LE fatigue that limited end of session. Continue with current POC as pt is responding well Patient will continue to benefit from skilled PT services to modify and progress therapeutic interventions, address functional mobility deficits, address ROM deficits, address strength deficits, analyze and address soft tissue restrictions, analyze and cue movement patterns and analyze and modify body mechanics/ergonomics to attain remaining goals. []  See Plan of Care 
[]  See progress note/recertification 
[]  See Discharge Summary Progress towards goals / Updated goals: 
Short Term Goals: To be accomplished in 2 weeks: 
            9. The patient will be independent and compliant with HEP to maximize therapeutic benefit. Met 11/012/2018 
            2. The patient will improve HS flexibility to 20 90/90 to reduce stress on lumbar spine. Long Term Goals: To be accomplished in 4 weeks: 
            1. The patient will improve FOTO score to 69 to maximize quality of life. 
            2. The patient will report no longer being limited by recreational activity to improve ease of recreation. 
            3. The patient will report not being limited by walking 1 mile. 
            4. The patient will improve prone plank to 30\" without anterior pelvic tilt to maximize stability during lifting.   
 
 
PLAN 
[]  Upgrade activities as tolerated     []  Continue plan of care 
[]  Update interventions per flow sheet      
[]  Discharge due to:_ 
[]  Other:_ Penelope Bertrand DPT, CMTPT 11/16/2018  5:11 PM 
 
Future Appointments Date Time Provider Anna Oseguera 11/20/2018  5:00 PM [place], [place] [facility]  
11/27/2018  5:00 PM Yuniel Hansen[address] [facility]  
12/3/2018 10:30 AM Carey Marroquin  [address]  
12/4/2018  5:00 PM Molly De [facility]

## 2018-11-20 ENCOUNTER — HOSPITAL ENCOUNTER (OUTPATIENT)
Dept: PHYSICAL THERAPY | Age: 28
Discharge: HOME | End: 2018-11-20
Payer: COMMERCIAL

## 2018-11-20 PROCEDURE — 97112 NEUROMUSCULAR REEDUCATION: CPT

## 2018-11-20 PROCEDURE — 97110 THERAPEUTIC EXERCISES: CPT

## 2018-11-20 NOTE — PROGRESS NOTES
PT DAILY TREATMENT NOTE 10-18 Patient Name: Marla Foss Date:2018 : 1990 [x]  Patient  Verified Payor: BLUE CROSS / Plan: [place] [employer] / Product Type: PPO / In Daniel Helton 60. Total Treatment Time (min): 51 Visit #: 4 of 8 Medicare/BCBS Only Total Timed Codes (min):  51 1:1 Treatment Time:  44 Treatment Area: Low back pain [M54.5] Myalgia, other site [M79.18] SUBJECTIVE Pain Level (0-10 scale): 1 Any medication changes, allergies to medications, adverse drug reactions, diagnosis change, or new procedure performed?: [x] No    [] Yes (see summary sheet for update) Subjective functional status/changes:   [] No changes reported \"Feeling good\" pt reports he felt great after last session OBJECTIVE Modality rationale: PD to improve the patients ability to Min Type Additional Details  
 [] Estim:  []Unatt       []IFC  []Premod []Other:  []w/ice   []w/heat Position: Location:  
 [] Estim: []Att    []TENS instruct  []NMES []Other:  []w/US   []w/ice   []w/heat Position: Location:  
 []  Traction: [] Cervical       []Lumbar 
                     [] Prone          []Supine []Intermittent   []Continuous Lbs: 
[] before manual 
[] after manual  
 []  Ultrasound: []Continuous   [] Pulsed []1MHz   []3MHz W/cm2: 
Location:  
 []  Iontophoresis with dexamethasone Location: [] Take home patch  
[] In clinic  
 []  Ice     []  heat 
[]  Ice massage 
[]  Laser  
[]  Anodyne Position: Location:  
 []  Laser with stim 
[]  Other:  Position: Location:  
 []  Vasopneumatic Device Pressure:       [] lo [] med [] hi  
Temperature: [] lo [] med [] hi  
[] Skin assessment post-treatment:  []intact []redness- no adverse reaction 
  []redness  adverse reaction:  
 
 
 
26 min Therapeutic Exercise:  [] See flow sheet :  
 Rationale: increase ROM and increase strength to improve the patients ability to perform ADLs 25 min Neuromuscular Re-education:  []  See flow sheet :  
Rationale: increase strength and improve coordination  to improve the patients ability to perform work duties and functional tasks with improved core stability With 
 [] TE 
 [] TA 
 [] neuro 
 [] other: Patient Education: [x] Review HEP [] Progressed/Changed HEP based on:  
[] positioning   [] body mechanics   [] transfers   [] heat/ice application   
[] other:   
 
Other Objective/Functional Measures:  
90/90 HS flexibility Right 20 deg Left 27 deg Pain Level (0-10 scale) post treatment: 1 ASSESSMENT/Changes in Function: Pt progressing well at this time with pain levels and core strength. He reports decreased knee pain as well as decreased lumbar pain. Progress as tolerated with core strength and functional mechanics Patient will continue to benefit from skilled PT services to modify and progress therapeutic interventions, address functional mobility deficits, address ROM deficits, address strength deficits, analyze and address soft tissue restrictions, analyze and cue movement patterns and analyze and modify body mechanics/ergonomics to attain remaining goals. []  See Plan of Care 
[]  See progress note/recertification 
[]  See Discharge Summary Progress towards goals / Updated goals: 
Short Term Goals: To be accomplished in 2 weeks: 
            6. The patient will be independent and compliant with HEP to maximize therapeutic benefit. Met 11/012/2018 
            2. The patient will improve HS flexibility to 20 90/90 to reduce stress on lumbar spine. Partially met still limited left 11/20/18 Long Term Goals: To be accomplished in 4 weeks: 
            1. The patient will improve FOTO score to 69 to maximize quality of life. 
            2.  The patient will report no longer being limited by recreational activity to improve ease of recreation. 
            3. The patient will report not being limited by walking 1 mile. 
            4. The patient will improve prone plank to 30\" without anterior pelvic tilt to maximize stability during lifting.   
 
PLAN [x]  Upgrade activities as tolerated     []  Continue plan of care 
[]  Update interventions per flow sheet      
[]  Discharge due to:_ 
[]  Other:_ Teri Conner DPT, CMTPT 11/20/2018  5:03 PM 
 
Future Appointments Date Time Provider Anna Amadoi 11/27/2018  5:00 PM Tyrone [facility] [facility]  
12/3/2018 10:30 AM Darrius Mcneill  [address]  
12/4/2018  5:00 PM [facility]

## 2018-11-27 ENCOUNTER — HOSPITAL ENCOUNTER (OUTPATIENT)
Dept: PHYSICAL THERAPY | Age: 28
Discharge: HOME | End: 2018-11-27
Payer: COMMERCIAL

## 2018-11-27 PROCEDURE — 97112 NEUROMUSCULAR REEDUCATION: CPT

## 2018-11-27 PROCEDURE — 97110 THERAPEUTIC EXERCISES: CPT

## 2018-11-27 NOTE — PROGRESS NOTES
PT DAILY TREATMENT NOTE 10-18 Patient Name: Huseyin Zavala Date:2018 : 1990 [x]  Patient  Verified Payor: BLUE CROSS / Plan: [place] BLUE CROSS [employer]ERAL / Product Type: PPO / In time:5:00  Out time:5:50 Total Treatment Time (min): 50 Visit #: 5 of 8 Medicare/BCBS Only Total Timed Codes (min):  50 1:1 Treatment Time:  34 Treatment Area: Low back pain [M54.5] Myalgia, other site [M79.18] SUBJECTIVE Pain Level (0-10 scale): 0 Any medication changes, allergies to medications, adverse drug reactions, diagnosis change, or new procedure performed?: [x] No    [] Yes (see summary sheet for update) Subjective functional status/changes:   [] No changes reported \"Feel amazing\" pt reports continued decrease in back pain with daily tasks OBJECTIVE Modality rationale: PD to improve the patients ability to Min Type Additional Details  
 [] Estim:  []Unatt       []IFC  []Premod []Other:  []w/ice   []w/heat Position: Location:  
 [] Estim: []Att    []TENS instruct  []NMES []Other:  []w/US   []w/ice   []w/heat Position: Location:  
 []  Traction: [] Cervical       []Lumbar 
                     [] Prone          []Supine []Intermittent   []Continuous Lbs: 
[] before manual 
[] after manual  
 []  Ultrasound: []Continuous   [] Pulsed []1MHz   []3MHz W/cm2: 
Location:  
 []  Iontophoresis with dexamethasone Location: [] Take home patch  
[] In clinic  
 []  Ice     []  heat 
[]  Ice massage 
[]  Laser  
[]  Anodyne Position: Location:  
 []  Laser with stim 
[]  Other:  Position: Location:  
 []  Vasopneumatic Device Pressure:       [] lo [] med [] hi  
Temperature: [] lo [] med [] hi  
[] Skin assessment post-treatment:  []intact []redness- no adverse reaction 
  []redness  adverse reaction:  
 
 
26 min Therapeutic Exercise:  [] See flow sheet :  
 Rationale: increase ROM and increase strength to improve the patients ability to perform ADLs 24 min Neuromuscular Re-education:  []  See flow sheet :  
Rationale: increase strength, improve coordination and increase proprioception  to improve the patients ability to perform work duties and ADLs with improved core stability With 
 [] TE 
 [] TA 
 [] neuro 
 [] other: Patient Education: [x] Review HEP [] Progressed/Changed HEP based on:  
[] positioning   [] body mechanics   [] transfers   [] heat/ice application   
[] other:   
 
Other Objective/Functional Measures: FOTO score 54% Pain Level (0-10 scale) post treatment: 0 
 
ASSESSMENT/Changes in Function: Pt doing quite well in regards to back pain and daily task performance void of back pain. Potential D/C at next visit following MD f/u. Patient will continue to benefit from skilled PT services to modify and progress therapeutic interventions, address functional mobility deficits, address ROM deficits, address strength deficits, analyze and address soft tissue restrictions, analyze and cue movement patterns, analyze and modify body mechanics/ergonomics and assess and modify postural abnormalities to attain remaining goals. []  See Plan of Care 
[]  See progress note/recertification 
[]  See Discharge Summary Progress towards goals / Updated goals: 
Short Term Goals: To be accomplished in 2 weeks: 
            1. The patient will be independent and compliant with HEP to maximize therapeutic benefit. Met 11/012/2018 
            2. The patient will improve HS flexibility to 20 90/90 to reduce stress on lumbar spine. Partially met still limited left 11/20/18 Long Term Goals: To be accomplished in 4 weeks: 
            1. The patient will improve FOTO score to 69 to maximize quality of life. No change despite subjective reports of improved back and knee pain since [facility] 11/27/18             2. The patient will report no longer being limited by recreational activity to improve ease of recreation. 
            3. The patient will report not being limited by walking 1 mile. 
            4. The patient will improve prone plank to 30\" without anterior pelvic tilt to maximize stability during lifting.   
 
 
PLAN [x]  Upgrade activities as tolerated     [x]  Continue plan of care 
[]  Update interventions per flow sheet      
[]  Discharge due to:_ 
[]  Other:_ Pj Pemberton DPT, CMTPT 11/27/2018  5:08 PM 
 
Future Appointments Date Time Provider Anna Oseguera 12/3/2018 10:30 AM Diannah Hodgkins,  [address]  
12/4/2018  5:00 PM Kamran Rick [facility]

## 2018-12-03 ENCOUNTER — OFFICE VISIT (OUTPATIENT)
Dept: ORTHOPEDIC SURGERY | Age: 28
End: 2018-12-03

## 2018-12-03 VITALS
HEART RATE: 60 BPM | DIASTOLIC BLOOD PRESSURE: 100 MMHG | OXYGEN SATURATION: 95 % | SYSTOLIC BLOOD PRESSURE: 142 MMHG | BODY MASS INDEX: 42.66 KG/M2 | RESPIRATION RATE: 18 BRPM | HEIGHT: 72 IN | WEIGHT: 315 LBS | TEMPERATURE: 98.2 F

## 2018-12-03 DIAGNOSIS — M54.50 LUMBAR PAIN: ICD-10-CM

## 2018-12-03 DIAGNOSIS — M54.59 MECHANICAL LOW BACK PAIN: Primary | ICD-10-CM

## 2018-12-03 DIAGNOSIS — M79.18 MYOFASCIAL PAIN: ICD-10-CM

## 2018-12-03 NOTE — PROGRESS NOTES
[address]  [place], [address] [place] [address] Street  Phone: [phone]  Fax: [phone]        Ilene Colindres  : 1990  PCP: Isabela Jordan MD  12/3/2018    PROGRESS NOTE      ASSESSMENT AND PLAN    Medardo Ann comes in to the office today for PT f/u. He reports that he has seen significant improvement of his low back pain, and he no longer experiences episodic paraesthesia in his left thigh. He has been maintaining an HEP of exercises from PT. He rates his pain as a 0/10 today. I advised he continue maintaining an HEP and move around at work when needed. Pt will f/u PRN. Diagnoses and all orders for this visit:    1. Mechanical low back pain    2. Lumbar pain    3. Myofascial pain      Follow-up Disposition: Not on File      HISTORY OF PRESENT ILLNESS  Medardo Ann is a 25 y.o. male. A&P / HPI from 10/15/18:  Medardo Ann comes in to the office today c/o chronic low back pain x 7 years with episodic paraesthesia in his left thigh x 2 months. He has had episodes of his back \"seizing up. \" He denies weakness. His pain improves with movement and stretching. He maintains an HEP of work related tasks.     On examination, he maintains strength and sensation. He is neurologically intact. He had tenderness to palpation of his lumbar paraspinals. He had no pain reproduced with provocative movements.     His pain is likely myofascial in nature. I referred to PT with optional dry needling and pilates equipment based therapy.     I thoroughly advised on exercising and maintaining an HEP. I also advised on proper biomechanics.     Pt will f/u in 6 weeks or sooner if needed. HISTORY OF PRESENT ILLNESS  Medardo Ann is a 25 y.o. male c/o chronic low back pain x 7 years with episodic paraesthesia in his left thigh x 2 months. Pt denies recent weight gain or tight clothing. Pt denies any weakness.  He describes his low back pain currently as \"mild\" but with flare ups. He had an episode where he was walking around his house, and his \"back seized up. \" He also had a similar episode when he worked at [employer] and set down a [employer]. He attempts to avoid sitting still, as loosening his back with movement improves his pain. He will also perform exercises from his previous round of PT (2014). He found some relief from Flexeril. He has also found some relief from massage.     He maintains an HEP of work-related tasks (lifting tools, walking up stairs). He previously was active in martial arts 3+ years ago, and at the time, he experienced no pain.     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        Pt denies any fevers, chills, nausea, vomiting. Pt denies any chest pain and shortness of breath. Pt denies any ear, nose, and throat problems. Pt denies any fecal or urinary incontinence.      Updates from 12/03/18:  Pt presents for PT f/u. He reports that he has seen significant improvement of his low back pain, and he no longer experiences episodic paraesthesia in his left thigh. He reports that he has been taking the stairs rather then the elevator now. PAST MEDICAL HISTORY   No past medical history on file. Past Surgical History:   Procedure Laterality Date    HX OTHER SURGICAL     . MEDICATIONS    Current Outpatient Medications   Medication Sig Dispense Refill    ibuprofen 200 mg cap Take  by mouth. ALLERGIES  Allergies   Allergen Reactions    Augmentin [Amoxicillin-Pot Clavulanate] Diarrhea          SOCIAL HISTORY    Social History     Socioeconomic History    Marital status:      Spouse name: Not on file    Number of children: Not on file    Years of education: Not on file    Highest education level: Not on file   Tobacco Use    Smoking status: Never Smoker    Smokeless tobacco: Never Used   Substance and Sexual Activity    Alcohol use:  Yes    Drug use: No       FAMILY HISTORY  Family History   Problem Relation Age of Onset    Hypertension Father          REVIEW OF SYSTEMS  Review of Systems   Musculoskeletal: Positive for back pain. Episodic left thigh paraesthesia          PHYSICAL EXAMINATION  There were no vitals taken for this visit. Pain Assessment  10/15/2018   Location of Pain Back   Severity of Pain 2   Quality of Pain Throbbing; Sharp   Duration of Pain A few hours   Frequency of Pain Intermittent   Aggravating Factors Bending   Relieving Factors Rest;Ice;Heat   Result of Injury No           Constitutional:  Well developed, well nourished, in no acute distress. Psychiatric: Affect and mood are appropriate. Integumentary: No rashes or abrasions noted on exposed areas. SPINE/MUSCULOSKELETAL EXAM    Cervical spine:  Neck is midline. Normal muscle tone. No focal atrophy is noted. ROM pain free. Shoulder ROM intact. No tenderness to palpation. Negative Spurling's sign. Negative Tinel's sign. Negative Rees's sign. Sensation in the bilateral arms grossly intact to light touch.      Lumbar spine:  No rash, ecchymosis, or gross obliquity. No fasciculations. No focal atrophy is noted. No pain with hip ROM. Full range of motion. Tenderness to palpation of lumbar paraspinals (L>R). No tenderness to palpation at the sciatic notch. SI joints non-tender. Trochanters non tender. Sensation in the bilateral legs grossly intact to light touch.     MOTOR:      Biceps  Triceps Deltoids Wrist Ext Wrist Flex Hand Intrin   Right 5/5 5/5 5/5 5/5 5/5 5/5   Left 5/5 5/5 5/5 5/5 5/5 5/5             Hip Flex  Quads Hamstrings Ankle DF EHL Ankle PF   Right 5/5 5/5 5/5 5/5 5/5 5/5   Left 5/5 5/5 5/5 5/5 5/5 5/5     DTRs are 2+ biceps, triceps, brachioradialis, patella, and Achilles.     Negative Straight Leg raise. Squat not tested.    No difficulty with tandem gait.      Ambulation without assistive device. FWB.       RADIOGRAPHS  2V Lumbar XR images taken on 10/15/18 personally reviewed with patient:  Joint space narrowing in bilateral hips. No significant facet sclerosis. Slight pelvic obliquity with left hip higher than right. Sacralized L5. No obvious pars defects. Disc space narrowing vs obliquity at T12-L1, otherwise, normal disc spacing. No obvious compression fractures or instabilities. 10 minutes of face-to-face contact were spent with the patient during today's visit extensively discussing symptoms and treatment plan. All questions were answered. More than half of this visit today was spent on counseling.      Written by Netta Bess as dictated by Tammy Kowalski MD

## 2018-12-04 ENCOUNTER — HOSPITAL ENCOUNTER (OUTPATIENT)
Dept: PHYSICAL THERAPY | Age: 28
End: 2018-12-04
Payer: COMMERCIAL

## 2018-12-18 ENCOUNTER — HOSPITAL ENCOUNTER (OUTPATIENT)
Dept: PHYSICAL THERAPY | Age: 28
Discharge: HOME | End: 2018-12-18
Payer: COMMERCIAL

## 2018-12-18 PROCEDURE — 97110 THERAPEUTIC EXERCISES: CPT

## 2018-12-18 PROCEDURE — 97112 NEUROMUSCULAR REEDUCATION: CPT

## 2018-12-18 NOTE — PROGRESS NOTES
PT DISCHARGE DAILY NOTE AND [id number]    Date:2018      Patient name: Sara Frey Start of Care: 10/29/2018   Referral source: Oliva Cramer MD : 1990               Medical Diagnosis: Myalgia, other site [M79.18]  Low back pain [M54.5]  Payor: Anne Pascual / Plan: [employer] / Product Type: PPO /  Onset Date:Chronic, most recently 1 month ago               Treatment Diagnosis: Mechanical low back pain   Prior Hospitalization: see medical history Provider#: [id number]   Medications: Verified on Patient summary List    Comorbidities: None listed   Prior Level of Function: The patient states that he had improved ease sitting prior to about 1 month ago.   Visits from Start of Care: 6    Missed Visits: 1    Reporting Period : 10/29/2018 to 2018    Date:2018  : 1990  [x]  Patient  Verified  Payor: Anne Na / Plan: [employer] / Product Type: PPO /    In time:5:00  Out time:5:50  Total Treatment Time (min): 50  Visit #: 6 of 8    Medicare/BCBS Only   Total Timed Codes (min):  50 1:1 Treatment Time:  38       SUBJECTIVE  Pain Level (0-10 scale): 0  Any medication changes, allergies to medications, adverse drug reactions, diagnosis change, or new procedure performed?: [x] No    [] Yes (see summary sheet for update)  Subjective functional status/changes:   [] No changes reported  \"No back pain at all\"    OBJECTIVE    Modality rationale: PD to improve the patients ability to    Type Additional Details   [] Estim:  []Unatt       []IFC  []Premod                        []Other:  []w/ice   []w/heat  Position:  Location:   [] Estim: []Att    []TENS instruct  []NMES                    []Other:  []w/US   []w/ice   []w/heat  Position:  Location:   []  Traction: [] Cervical       []Lumbar                       [] Prone          []Supine                       []Intermittent   []Continuous Lbs:  [] before manual  [] after manual   []  Ultrasound: []Continuous   [] Pulsed                           []1MHz   []3MHz W/cm2:  Location:   []  Iontophoresis with dexamethasone         Location: [] Take home patch   [] In clinic   []  Ice     []  heat  []  Ice massage  []  Laser   []  Anodyne Position:  Location:   []  Laser with stim  []  Other:  Position:  Location:   []  Vasopneumatic Device Pressure:       [] lo [] med [] hi   Temperature: [] lo [] med [] hi   [] Skin assessment post-treatment:  []intact []redness- no adverse reaction    []redness  adverse reaction:       26 min Therapeutic Exercise:  [] See flow sheet :   Rationale: increase ROM and increase strength to improve the patients ability to perform daily tasks and work duties     24 min Neuromuscular Re-education:  []  See flow sheet :   Rationale: improve coordination and increase proprioception  to improve the patients ability to perform functional tasks with improved lumbopelvic mechanics            With   [] TE   [] TA   [] neuro   [] other: Patient Education: [x] Review HEP    [] Progressed/Changed HEP based on:   [] positioning   [] body mechanics   [] transfers   [] heat/ice application    [] other:      Other Objective/Functional Measures: pt reports some intermittent dizziness during today's session reporting it is related to his sinuses. Not limiting with therex performance aside from holding one set of standing Reformer hip hinges     Pain Level (0-10 scale) post treatment: 0    Summary of Care:  Short Term Goals: To be accomplished in 2 weeks:              1. The patient will be independent and compliant with HEP to maximize therapeutic benefit. Met 11/012/2018              2. The patient will improve HS flexibility to 20 90/90 to reduce stress on lumbar spine. Partially met still limited left 11/20/18 Met 18 deg right 20 deg left 12/18/18  Long Term Goals: To be accomplished in 4 weeks:              1. The patient will improve FOTO score to 69 to maximize quality of life.  No change despite subjective reports of improved back and knee pain since [facility] 11/27/18; Met FOTO increased to 94% 12/18/18              2. The patient will report no longer being limited by recreational activity to improve ease of recreation. Met pt reports no back pain and improved knee pain allowing him to perform daily activities 12/18/18              3. The patient will report not being limited by walking 1 mile. Improved to limited a little on 11/27 Met not limited (initial \"limited a lot\") 11/18/18              4. The patient will improve prone plank to 30\" without anterior pelvic tilt to maximize stability during lifting. met 30\" without loss of neutral spine 12/18/18      ASSESSMENT/Changes in Function: Pt has made excellent progress with PT demonstrating abolishment of back pain and improved activity tolerance/participation. Will D/C to HEP management at this time.     Thank you for this referral!      PLAN  [x]Discontinue therapy: [x]Patient has reached or is progressing toward set goals      []Patient is non-compliant or has abdicated      []Due to lack of appreciable progress towards set goals    Sylvie Cody DPT, CMTPT 12/18/2018  5:06 PM